# Patient Record
Sex: MALE | Race: WHITE
[De-identification: names, ages, dates, MRNs, and addresses within clinical notes are randomized per-mention and may not be internally consistent; named-entity substitution may affect disease eponyms.]

---

## 2020-01-29 ENCOUNTER — HOSPITAL ENCOUNTER (EMERGENCY)
Dept: HOSPITAL 41 - JD.ED | Age: 45
Discharge: NURSING, REHAB OR LONG-TERM CARE | End: 2020-01-29
Payer: SELF-PAY

## 2020-01-29 DIAGNOSIS — Z88.0: ICD-10-CM

## 2020-01-29 DIAGNOSIS — E11.9: ICD-10-CM

## 2020-01-29 DIAGNOSIS — Z88.1: ICD-10-CM

## 2020-01-29 DIAGNOSIS — F17.210: ICD-10-CM

## 2020-01-29 DIAGNOSIS — F32.9: Primary | ICD-10-CM

## 2020-01-29 PROCEDURE — 96361 HYDRATE IV INFUSION ADD-ON: CPT

## 2020-01-29 PROCEDURE — 80053 COMPREHEN METABOLIC PANEL: CPT

## 2020-01-29 PROCEDURE — 82962 GLUCOSE BLOOD TEST: CPT

## 2020-01-29 PROCEDURE — 99285 EMERGENCY DEPT VISIT HI MDM: CPT

## 2020-01-29 PROCEDURE — 85025 COMPLETE CBC W/AUTO DIFF WBC: CPT

## 2020-01-29 PROCEDURE — 36415 COLL VENOUS BLD VENIPUNCTURE: CPT

## 2020-01-29 PROCEDURE — 96360 HYDRATION IV INFUSION INIT: CPT

## 2020-01-29 PROCEDURE — 80320 DRUG SCREEN QUANTALCOHOLS: CPT

## 2020-01-29 PROCEDURE — G0480 DRUG TEST DEF 1-7 CLASSES: HCPCS

## 2020-01-29 PROCEDURE — 80306 DRUG TEST PRSMV INSTRMNT: CPT

## 2020-01-29 PROCEDURE — 83735 ASSAY OF MAGNESIUM: CPT

## 2020-01-29 NOTE — EDM.PDOC
ED HPI GENERAL MEDICAL PROBLEM





- General


Chief Complaint: Behavioral/Psych


Stated Complaint: SUICIDAL IDEATIONS


Time Seen by Provider: 01/29/20 09:29


Source of Information: Reports: Patient, RN Notes Reviewed





- History of Present Illness


INITIAL COMMENTS - FREE TEXT/NARRATIVE: 





44-year-old male comes in with suicidal ideation. He does have history of 

chronic depression. He states he has been on antidepressants and has seen 

various counselors in the past not seen a counselor for a long time and has 

been off medication for about the last 18 months. He's been feeling more 

depressed this past week or 2. Having more suicidal thoughts the last 3-4 days. 

He states he normally only drinks alcohol on occasion but has been drinking 

alcohol more frequently and more of it for the past 1-2 weeks. He states he is 

drinking the alcohol to self medicate, not that he feels he is alcohol 

dependent. He has not been using drugs of any type. He is not on any regular 

medications at this time. He does not smoke.





- Related Data


 Allergies











Allergy/AdvReac Type Severity Reaction Status Date / Time


 


amoxicillin Allergy  Cannot Verified 01/29/20 09:06





   Remember  


 


Penicillins Allergy  Cannot Verified 01/29/20 09:06





   Remember  











Home Meds: 


 Home Meds





. [No Known Home Meds]  01/29/20 [History]











Past Medical History


Psychiatric History: Reports: Anxiety, Depression, Suicidal Ideation


Endocrine/Metabolic History: Reports: Diabetes, Type II


Other Endocrine/Metabolic History: on metformin in the past





- Past Surgical History


HEENT Surgical History: Reports: Tonsillectomy





Social & Family History





- Tobacco Use


Smoking Status *Q: Current Every Day Smoker


Years of Tobacco use: 20


Packs/Tins Daily: 0.2





- Caffeine Use


Caffeine Use: Reports: Soda





- Alcohol Use


Days Per Week of Alcohol Use: 7


Number of Drinks Per Day: 6


Total Drinks Per Week: 42





- Recreational Drug Use


Recreational Drug Use: No





ED ROS GENERAL





- Review of Systems


Review Of Systems: See Below


Constitutional: Denies: Fever, Chills, Diaphoresis


HEENT: Reports: No Symptoms


Respiratory: Denies: Shortness of Breath


Cardiovascular: Denies: Chest Pain


GI/Abdominal: Denies: Abdominal Pain, Nausea, Vomiting


Musculoskeletal: Denies: Shoulder Pain, Arm Pain


Skin: Reports: Bruising (Occasional bruising lower legs)


Neurological: Reports: Numbness (Patient has sensation of "pins and needles 

bilateral feet)


Psychiatric: Reports: Anxiety, Depression, Suicidal Ideation





- Physical Exam


Exam: See Below


Exam Limited By: No Limitations


General Appearance: Alert


Eye Exam: Bilateral Eye: PERRL


Ears: Normal External Exam


Nose: Normal Inspection


Throat/Mouth: Normal Inspection


Head Exam: Atraumatic.  No: Facial Swelling


Neck: Supple, Non-Tender


Respiratory/Chest: No Respiratory Distress, Lungs Clear, Normal Breath Sounds


Cardiovascular: Tachycardia


GI/Abdominal: Soft, Non-Tender


Neuro Exam (Abbreviated): Alert, Oriented, No Motor/Sensory Deficits


Extremities: Normal Inspection, Normal Range of Motion


Skin Exam: Warm, Dry, Normal Color, Ecchymosis (Couple small areas of bruising 

both lower anterior legs)





Course





- Vital Signs


Last Recorded V/S: 


 Last Vital Signs











Temp  98.4 F   01/29/20 09:06


 


Pulse  114 H  01/29/20 09:06


 


Resp  12   01/29/20 09:06


 


BP  163/109 H  01/29/20 09:06


 


Pulse Ox  98   01/29/20 09:06














- Orders/Labs/Meds


Orders: 


 Active Orders 24 hr











 Category Date Time Status


 


 MAGNESIUM [CHEM] Routine Lab  01/29/20 09:48 Received


 


 Dextrose 5%-Lactated Ringers 1,000 ml Med  01/29/20 10:15 Active





 IV ASDIRECTED   








 Medication Orders





Dextrose/Lactated Ringer's (Dextrose 5%-Lactated Ringers)  1,000 mls @ 150 mls/

hr IV ASDIRECTED VIRY


   Last Admin: 01/29/20 10:36  Dose: 150 mls/hr








Labs: 


 Laboratory Tests











  01/29/20 01/29/20 01/29/20 Range/Units





  09:48 09:48 11:35 


 


WBC  4.72    (4.23-9.07)  K/mm3


 


RBC  5.70    (4.63-6.08)  M/mm3


 


Hgb  16.6    (13.7-17.5)  gm/dl


 


Hct  46.7    (40.1-51.0)  %


 


MCV  81.9    (79.0-92.2)  fl


 


MCH  29.1    (25.7-32.2)  pg


 


MCHC  35.5    (32.2-35.5)  g/dl


 


RDW Std Deviation  41.6    (35.1-43.9)  fL


 


Plt Count  152 L    (163-337)  K/mm3


 


MPV  9.2 L    (9.4-12.3)  fl


 


Neut % (Auto)  76.6 H    (34.0-67.9)  %


 


Lymph % (Auto)  9.7 L    (21.8-53.1)  %


 


Mono % (Auto)  9.1    (5.3-12.2)  %


 


Eos % (Auto)  4.0    (0.8-7.0)  


 


Baso % (Auto)  0.4    (0.1-1.2)  %


 


Neut # (Auto)  3.61    (1.78-5.38)  K/mm3


 


Lymph # (Auto)  0.46 L    (1.32-3.57)  K/mm3


 


Mono # (Auto)  0.43    (0.30-0.82)  K/mm3


 


Eos # (Auto)  0.19    (0.04-0.54)  K/mm3


 


Baso # (Auto)  0.02    (0.01-0.08)  K/mm3


 


Manual Slide Review  Abnormal smear    


 


Sodium   131 L   (136-145)  mEq/L


 


Potassium   4.0   (3.5-5.1)  mEq/L


 


Chloride   97 L   ()  mEq/L


 


Carbon Dioxide   24   (21-32)  mEq/L


 


Anion Gap   14.0   (5-15)  


 


BUN   11   (7-18)  mg/dL


 


Creatinine   0.9   (0.7-1.3)  mg/dL


 


Est Cr Clr Drug Dosing   110.40   mL/min


 


Estimated GFR (MDRD)   > 60   (>60)  mL/min


 


BUN/Creatinine Ratio   12.2 L   (14-18)  


 


Glucose   395 H   ()  mg/dL


 


Calcium   8.9   (8.5-10.1)  mg/dL


 


Total Bilirubin   1.2 H   (0.2-1.0)  mg/dL


 


AST   18   (15-37)  U/L


 


ALT   33   (16-63)  U/L


 


Alkaline Phosphatase   157 H   ()  U/L


 


Total Protein   7.4   (6.4-8.2)  g/dl


 


Albumin   4.1   (3.4-5.0)  g/dl


 


Globulin   3.3   gm/dL


 


Albumin/Globulin Ratio   1.2   (1-2)  


 


Urine Opiates Screen    Negative  (BDGUJK=239)  


 


Ur Buprenorphine Scrn    Negative  (CUTOFF=10)  


 


Ur Oxycodone Screen    Negative  (QNZ0IY=458)  


 


Urine Methadone Screen    Negative  (CLQ5AE=002)  


 


Ur Propoxyphene Screen    Negative  (BUZYOG=588)  


 


Ur Barbiturates Screen    Negative  (EHUSCQ=335)  


 


Ur Tricyclics Screen    Negative  (UOJUXU=635)  


 


Ur Phencyclidine Scrn    Negative  (CUTOFF=25)  


 


Ur Amphetamine Screen    Negative  (SNQJZC=215)  


 


U Methamphetamines Scrn    Negative  (OQBBWP=855)  


 


U Benzodiazepines Scrn    Negative  (XTPAUC=590)  


 


U Cocaine Metab Screen    Negative  (LXFBFS=947)  


 


U Marijuana (THC) Screen    Negative  (CUTOFF=50)  


 


Ethyl Alcohol   0.00   (0.00)  gm%











Meds: 


Medications











Generic Name Dose Route Start Last Admin





  Trade Name Freq  PRN Reason Stop Dose Admin


 


Dextrose/Lactated Ringer's  1,000 mls @ 150 mls/hr  01/29/20 10:15  01/29/20 10:

36





  Dextrose 5%-Lactated Ringers  IV   150 mls/hr





  [facility]ED [facility]   Administration





     





     





     





     














- Re-Assessments/Exams


Free Text/Narrative Re-Assessment/Exam: 





01/29/20 12:13


Patient has strong suicidal ideation. He is not currently under the care of a 

psychiatrist and has been off medication for about 18 months. Therefore he has 

strong indication for inpatient psychiatry hospitalization, evaluation.  I have 

discussed this with Dr Castellanos, [facility].  Darcy our  is 

working to arrange for transportation at this time. The plan is for him to go 

by [facility]'s deputy. Appropriate commital papers have been filled out and 

signed. Blood alcohol did come back negative, screen negative. Her okay, he is 

medically stable for transfer. He will be a direct admit to inpatient psych.


01/29/20 12:28








Departure





- Departure


Time of Disposition: 12:15


Disposition: DC/[facility]fer to Acute Hospital [facility]


Condition: Serious


Clinical Impression: 


 Suicidal ideation, Depression








- Discharge Information


Referrals: 


PCP,None [Primary Care Provider] - 


Forms:  ED Department Discharge





Sepsis Event Note





- Evaluation


Sepsis Screening Result: No Definite Risk





- Focused Exam


Vital Signs: 


 Vital Signs











  Temp Pulse Resp BP Pulse Ox


 


 01/29/20 09:06  98.4 F  114 H  12  163/109 H  98











Date Exam was Performed: 01/29/20


Time Exam was Performed: 12:28





- My Orders


Last 24 Hours: 


My Active Orders





01/29/20 09:48


MAGNESIUM [CHEM] Routine 





01/29/20 10:15


Dextrose 5%-Lactated Ringers 1,000 ml IV ASDIRECTED 














- Assessment/Plan


Last 24 Hours: 


My Active Orders





01/29/20 09:48


MAGNESIUM [CHEM] Routine 





01/29/20 10:15


Dextrose 5%-Lactated Ringers 1,000 ml IV ASDIRECTED